# Patient Record
Sex: MALE | Race: WHITE
[De-identification: names, ages, dates, MRNs, and addresses within clinical notes are randomized per-mention and may not be internally consistent; named-entity substitution may affect disease eponyms.]

---

## 2017-02-14 ENCOUNTER — HOSPITAL ENCOUNTER (EMERGENCY)
Dept: HOSPITAL 17 - PHED | Age: 42
Discharge: HOME | End: 2017-02-14
Payer: SELF-PAY

## 2017-02-14 VITALS — WEIGHT: 227.08 LBS | BODY MASS INDEX: 31.79 KG/M2 | HEIGHT: 71 IN

## 2017-02-14 VITALS
DIASTOLIC BLOOD PRESSURE: 90 MMHG | SYSTOLIC BLOOD PRESSURE: 142 MMHG | RESPIRATION RATE: 18 BRPM | OXYGEN SATURATION: 98 % | HEART RATE: 90 BPM | TEMPERATURE: 98.8 F

## 2017-02-14 DIAGNOSIS — R07.89: ICD-10-CM

## 2017-02-14 DIAGNOSIS — F17.210: ICD-10-CM

## 2017-02-14 DIAGNOSIS — R10.9: Primary | ICD-10-CM

## 2017-02-14 LAB
ANION GAP SERPL CALC-SCNC: 8 MEQ/L (ref 5–15)
BASOPHILS # BLD AUTO: 0.3 TH/MM3 (ref 0–0.2)
BASOPHILS NFR BLD: 2.6 % (ref 0–2)
BUN SERPL-MCNC: 16 MG/DL (ref 7–18)
CHLORIDE SERPL-SCNC: 108 MEQ/L (ref 98–107)
CK MB SERPL-MCNC: 0.7 NG/ML (ref 0.5–3.6)
CK SERPL-CCNC: 247 U/L (ref 39–308)
COLOR UR: YELLOW
COMMENT (UR): (no result)
CULTURE IF INDICATED: (no result)
EOSINOPHIL # BLD: 0.3 TH/MM3 (ref 0–0.4)
EOSINOPHIL NFR BLD: 2.5 % (ref 0–4)
ERYTHROCYTE [DISTWIDTH] IN BLOOD BY AUTOMATED COUNT: 12.3 % (ref 11.6–17.2)
GFR SERPLBLD BASED ON 1.73 SQ M-ARVRAT: 98 ML/MIN (ref 89–?)
GLUCOSE UR STRIP-MCNC: (no result) MG/DL
HCO3 BLD-SCNC: 25.7 MEQ/L (ref 21–32)
HCT VFR BLD CALC: 49.6 % (ref 39–51)
HEMO FLAGS: (no result)
HGB UR QL STRIP: (no result)
KETONES UR STRIP-MCNC: (no result) MG/DL
LYMPHOCYTES # BLD AUTO: 2.8 TH/MM3 (ref 1–4.8)
LYMPHOCYTES NFR BLD AUTO: 23 % (ref 9–44)
MCH RBC QN AUTO: 31.6 PG (ref 27–34)
MCHC RBC AUTO-ENTMCNC: 33.5 % (ref 32–36)
MCV RBC AUTO: 94.5 FL (ref 80–100)
METHOD OF COLLECTION: (no result)
MONOCYTES NFR BLD: 5.6 % (ref 0–8)
MUCOUS THREADS #/AREA URNS LPF: (no result) /LPF
NEUTROPHILS # BLD AUTO: 8.2 TH/MM3 (ref 1.8–7.7)
NEUTROPHILS NFR BLD AUTO: 66.3 % (ref 16–70)
NITRITE UR QL STRIP: (no result)
PLATELET # BLD: 300 TH/MM3 (ref 150–450)
POTASSIUM SERPL-SCNC: 3.9 MEQ/L (ref 3.5–5.1)
RBC # BLD AUTO: 5.25 MIL/MM3 (ref 4.5–5.9)
RBC #/AREA URNS HPF: (no result) /HPF (ref 0–3)
SODIUM SERPL-SCNC: 142 MEQ/L (ref 136–145)
SP GR UR STRIP: 1.03 (ref 1–1.03)
SQUAMOUS #/AREA URNS HPF: (no result) /HPF (ref 0–5)
WBC # BLD AUTO: 12.3 TH/MM3 (ref 4–11)

## 2017-02-14 PROCEDURE — 93005 ELECTROCARDIOGRAM TRACING: CPT

## 2017-02-14 PROCEDURE — 84484 ASSAY OF TROPONIN QUANT: CPT

## 2017-02-14 PROCEDURE — 81001 URINALYSIS AUTO W/SCOPE: CPT

## 2017-02-14 PROCEDURE — 80048 BASIC METABOLIC PNL TOTAL CA: CPT

## 2017-02-14 PROCEDURE — 71010: CPT

## 2017-02-14 PROCEDURE — 85025 COMPLETE CBC W/AUTO DIFF WBC: CPT

## 2017-02-14 PROCEDURE — 74177 CT ABD & PELVIS W/CONTRAST: CPT

## 2017-02-14 PROCEDURE — 82550 ASSAY OF CK (CPK): CPT

## 2017-02-14 PROCEDURE — 99284 EMERGENCY DEPT VISIT MOD MDM: CPT

## 2017-02-14 PROCEDURE — 82552 ASSAY OF CPK IN BLOOD: CPT

## 2017-02-14 NOTE — RADHPO
EXAM DATE/TIME:  02/14/2017 20:04 

 

[facility] COMPARISON:     

No previous studies available for comparison.

 

                     

INDICATIONS :     

Chest pain since last night.

                     

 

MEDICAL HISTORY :     

None.          

 

SURGICAL HISTORY :     

None.   

 

ENCOUNTER:     

Initial                                        

 

ACUITY:     

2 days      

 

PAIN SCORE:     

5/10

 

LOCATION:     

Bilateral chest 

 

FINDINGS:     

A single view of the chest demonstrates the lungs to be symmetrically aerated without evidence of mas
s, infiltrate or effusion.  The cardiomediastinal contours are unremarkable.  Osseous structures are 
intact.

 

CONCLUSION:     No acute disease.  

 

 

 

 Salvador Blair MD on February 14, 2017 at 20:28           

Board Certified Radiologist.

 This report was verified electronically.

## 2017-02-14 NOTE — RADHPO
EXAM DATE/TIME:  02/14/2017 20:57 

 

[facility] COMPARISON:     No previous studies available for comparison.

 

INDICATIONS :     Right upper quadrant pain.

                      

IV CONTRAST:     75 cc Omnipaque 350 (iohexol) IV 

 

ORAL CONTRAST:      No oral contrast ingested.

                      

RADIATION DOSE:     21.07 CTDIvol (mGy) 

 

MEDICAL HISTORY :     None  

SURGICAL HISTORY :      None. 

ENCOUNTER:      Initial

ACUITY:      1 day

PAIN SCALE:      7/10

LOCATION:       Right upper quadrant 

 

TECHNIQUE:     Volumetric scanning of the abdomen and pelvis was performed.  Using automated exposure
 control and adjustment of the mA and/or kV according to patient size, radiation dose was kept as low
 as reasonably achievable to obtain optimal diagnostic quality images. 

 

FINDINGS:     There is a 1.5 cm hyperdense area seen in the medial segment of the left lobe of the li
maurice.  This is nonspecific.  It likely represents a cyst or hemangioma.  The liver is otherwise clear.
  The gallbladder is decompressed. The spleen, pancreas, adrenal glands and kidneys appear normal.  T
he bowel appears normal.  The appendix is normal.  The pelvic structures appear grossly intact.  The 
lung bases are clear.  The bony structures are grossly intact.  

 

CONCLUSION:     

1. No acute abnormality is seen.  

2. 1.5 cm hyperdensity seen in the left lobe of the liver likely representing a cyst or hemangioma.  


 

 

 Salvador Blair MD on February 14, 2017 at 21:19                

Board Certified Radiologist.

 This report was verified electronically.

## 2017-02-14 NOTE — PD
HPI


Chief Complaint:  Chest Pain


Time Seen by Provider:  20:31


Travel History


International Travel<30 days:  No


Contact w/Intl Traveler<30days:  No


Traveled to known affect area:  No





History of Present Illness


HPI


The patient is a 41-year-old male that complains of right upper quadrant pain 

for one week and lower chest pain about 24 hours.  He denies any nausea or 

vomiting.  The patient has been here before for noncardiac chest pain.  He 

denies any history of heart disease.  He does not have a doctor and he does not 

have insurance.  He states his last drink of alcohol was Sunday.  He does smoke 

one pack a day.





[facility]


Past Medical History


Diminished Hearing:  No


Gastrointestinal Disorders:  Yes


Ulcer:  Yes (STOMACH/COLON)





Social History


Alcohol Use:  Yes (WEEKENDS)


Tobacco Use:  Yes (1 PPD)


Substance Use:  No





Allergies-Medications


(Allergen,Severity, Reaction):  


Coded Allergies:  


     No Known Allergies (Verified , 2/14/17)


Reported Meds & Prescriptions





Reported Meds & Active Scripts


Active








Review of Systems


Except as stated in HPI:  all other systems reviewed are Neg





Physical Exam


Narrative


GENERAL: The patient is alert, oriented 3 and slight apparent distress with 

his right flank pain.  His vital signs show blood pressure 142/90 but otherwise 

normal.


SKIN: Warm and dry.  No skin rash is present.


HEAD: Atraumatic. Normocephalic. 


EYES: Pupils equal and round. No scleral icterus. No injection or drainage. 


ENT: No nasal bleeding or discharge.  Mucous membranes pink and moist.


NECK: Trachea midline. No JVD. 


CARDIOVASCULAR: Regular rate and rhythm.  No murmur appreciated.


RESPIRATORY: No accessory muscle use. Clear to auscultation. Breath sounds 

equal bilaterally. 


GASTROINTESTINAL: Abdomen soft, with tenderness to direct palpation in the 

right upper quadrant, nondistended.  No guarding or rebound is present.  

Hepatic margin appears about 3 cm below the right costal margin.


MUSCULOSKELETAL: No obvious deformities. No clubbing.  No cyanosis.  No edema. 


NEUROLOGICAL: Awake and alert. No obvious cranial nerve deficits.  Motor 

grossly within normal limits. Normal speech.


PSYCHIATRIC: Appropriate mood and affect; insight and judgment normal.





Data


Data


Last Documented VS





Vital Signs








  Date Time  Temp Pulse Resp B/P Pulse Ox O2 Delivery O2 Flow Rate FiO2


 


2/14/17 18:21 98.8 90 18 142/90 98   








Orders





 Urinalysis - C+S If Indicated (2/14/17 19:15)


Electrocardiogram (2/14/17 19:22)


Complete Blood Count With Diff (2/14/17 19:22)


Basic Metabolic Panel (Bmp) (2/14/17 19:22)


Ckmb (Isoenzyme) Profile (2/14/17 19:22)


Troponin I (2/14/17 19:22)


Chest, Single Ap (2/14/17 19:22)


Iv Access Insert/Monitor (2/14/17 19:22)


Ecg Monitoring (2/14/17 19:22)


Oxygen Administration (2/14/17 19:22)


Oximetry (2/14/17 19:22)


CKMB (2/14/17 19:32)


CKMB% (2/14/17 19:32)


Ct Abd/Pel W Iv Contrast(Rout) (2/14/17 20:44)


Iohexol 350 Inj (Omnipaque 350 Inj) (2/14/17 21:13)





Labs





 Laboratory Tests








Test 2/14/17 2/14/17





 19:32 19:45


 


White Blood Count 12.3 TH/MM3 


 


Red Blood Count 5.25 MIL/MM3 


 


Hemoglobin 16.6 GM/DL 


 


Hematocrit 49.6 % 


 


Mean Corpuscular Volume 94.5 FL 


 


Mean Corpuscular Hemoglobin 31.6 PG 


 


Mean Corpuscular Hemoglobin 33.5 % 





Concent  


 


Red Cell Distribution Width 12.3 % 


 


Platelet Count 300 TH/MM3 


 


Mean Platelet Volume 7.4 FL 


 


Neutrophils (%) (Auto) 66.3 % 


 


Lymphocytes (%) (Auto) 23.0 % 


 


Monocytes (%) (Auto) 5.6 % 


 


Eosinophils (%) (Auto) 2.5 % 


 


Basophils (%) (Auto) 2.6 % 


 


Neutrophils # (Auto) 8.2 TH/MM3 


 


Lymphocytes # (Auto) 2.8 TH/MM3 


 


Monocytes # (Auto) 0.7 TH/MM3 


 


Eosinophils # (Auto) 0.3 TH/MM3 


 


Basophils # (Auto) 0.3 TH/MM3 


 


CBC Comment DIFF FINAL  


 


Differential Comment   


 


Sodium Level 142 MEQ/L 


 


Potassium Level 3.9 MEQ/L 


 


Chloride Level 108 MEQ/L 


 


Carbon Dioxide Level 25.7 MEQ/L 


 


Anion Gap 8 MEQ/L 


 


Blood Urea Nitrogen 16 MG/DL 


 


Creatinine 0.86 MG/DL 


 


Estimat Glomerular Filtration 98 ML/MIN 





Rate  


 


Random Glucose 104 MG/DL 


 


Calcium Level 8.3 MG/DL 


 


Total Creatine Kinase 247 U/L 


 


Creatine Kinase MB 0.7 NG/ML 


 


Troponin I LESS THAN 0.02 





 NG/ML 


 


Urine Collection Type  VOIDED 


 


Urine Color  YELLOW 


 


Urine Turbidity  CLEAR 


 


Urine pH  6.5 


 


Urine Specific Gravity  1.027 


 


Urine Protein  NEG mg/dL


 


Urine Glucose (UA)  NEG mg/dL


 


Urine Ketones  TRACE mg/dL


 


Urine Occult Blood  TRACE 


 


Urine Nitrite  NEG 


 


Urine Bilirubin  NEG 


 


Urine Leukocyte Esterase  NEG 


 


Urine RBC  4-9 /hpf


 


Urine Squamous Epithelial  0-2 /hpf





Cells  


 


Urine Amorphous Sediment  FEW 


 


Urine Mucus  FEW /lpf


 


Microscopic Urinalysis Comment  CULT NOT





  INDICATED











MDM


Medical Decision Making


Medical Screen Exam Complete:  Yes


Emergency Medical Condition:  Yes


Medical Record Reviewed:  Yes


Interpretation(s)


The CT abdomen/pelvis with IV contrast shows no acute abnormality.  There is a 

1.5 cm hyperdensity seen in the left lobe of liver which is likely a cyst or 

hemangioma.  The chest x-ray shows no acute disease.  The urine shows trace 

ketones, trace blood with 4-9 red cells but is otherwise normal and culture is 

not indicated.  The basic metabolic profile shows a calcium of 8.3 but is 

otherwise normal.  The cardiac enzymes are normal.  The CBC is normal except 

for minimal elevation of the white count at 12,300.  The EKG is normal with 

normal sinus rhythm rate of 80.


Differential Diagnosis


Ureteral stone, cholecystitis, cholelithiasis with colic, colitis, urinary 

tract infection, renal tumor, hepatic tumor/mass, acute coronary syndrome

highly unlikely, pneumothorax, chest wall pain, chest pain etiology undetermined

, abdominal pain etiology undetermined


Narrative Course


The patient has abdominal/chest pain etiology undetermined.  We could find no 

evidence of acute coronary syndrome or heart related problems.  He did not have 

a urinary stone and there is no evidence for choledocholithiasis.  No renal/

hepatic tumors were noted.





Impression: Abdominal pain unknown etiology





Plan: The patient to follow up with primary care physician.  He is given Motrin 

800 mg 3 times daily.





Diagnosis





 Primary Impression:  


 Abdominal pain of unknown etiology


 Additional Impression:  


 Chest wall pain





***Additional Instructions:


As we discussed, follow-up with a primary care physician about this pain.  We 

should try an anti-inflammatory such as Motrin 800 mg taken regularly 3 times 

daily.


***Med/Other Pt SpecificInfo:  Prescription(s) given


Scripts


Ibuprofen 800 Mg [id number] Mg PO TID  #40 TAB  Ref 0


   Prov:Kaushik Noguera MD         2/14/17


Disposition:  01 DISCHARGE HOME


Condition:  Stable








Kaushik Noguera MD Feb 14, 2017 20:44

## 2017-02-15 NOTE — EKG
Date Performed: 02/14/2017       Time Performed: 19:25:32

 

PTAGE:      41 years

 

EKG:      Sinus rhythm 

 

 Normal ECG

 

PREVIOUS TRACING       : 06/11/2015 15.38

 

DOCTOR:   Odessa Cerna  Interpretating Date/Time  02/15/2017 22:55:08

## 2018-02-01 ENCOUNTER — HOSPITAL ENCOUNTER (EMERGENCY)
Dept: HOSPITAL 17 - PHED | Age: 43
Discharge: HOME | End: 2018-02-01
Payer: SELF-PAY

## 2018-02-01 VITALS
OXYGEN SATURATION: 98 % | DIASTOLIC BLOOD PRESSURE: 79 MMHG | HEART RATE: 85 BPM | SYSTOLIC BLOOD PRESSURE: 126 MMHG | RESPIRATION RATE: 18 BRPM | TEMPERATURE: 97.4 F

## 2018-02-01 VITALS — WEIGHT: 228.84 LBS | BODY MASS INDEX: 32.04 KG/M2 | HEIGHT: 71 IN

## 2018-02-01 DIAGNOSIS — L02.416: Primary | ICD-10-CM

## 2018-02-01 DIAGNOSIS — B96.89: ICD-10-CM

## 2018-02-01 PROCEDURE — 10060 I&D ABSCESS SIMPLE/SINGLE: CPT

## 2018-02-01 PROCEDURE — 87205 SMEAR GRAM STAIN: CPT

## 2018-02-01 PROCEDURE — 87185 SC STD ENZYME DETCJ PER NZM: CPT

## 2018-02-01 PROCEDURE — 87070 CULTURE OTHR SPECIMN AEROBIC: CPT

## 2018-02-01 NOTE — PD
HPI


Chief Complaint:  Skin Problem


Time Seen by Provider:  03:47


Travel History


International Travel<30 days:  No


Contact w/Intl Traveler<30days:  No


Traveled to known affect area:  No





History of Present Illness


HPI


The patient is a 42-year-old male that noticed a lump becoming progressively 

larger and more painful in the last 4 days located in his left medial thigh, 

just below the inguinal ligament.  The patient denies any fever.  He saw 

another physician about this and this physician prescribed Cipro.  He did not 

drain the area.  His last tetanus shot was 10 years ago but he does not want a 

shot in tonight





[facility]


Past Medical History


Diminished Hearing:  No


Gastrointestinal Disorders:  Yes


Ulcer:  Yes (STOMACH/COLON)





Social History


Alcohol Use:  Yes (WEEKENDS)


Tobacco Use:  Yes (1 PPD)


Substance Use:  No





Allergies-Medications


(Allergen,Severity, Reaction):  


Coded Allergies:  


     No Known Allergies (Verified , 2/14/17)


Reported Meds & Prescriptions





Reported Meds & Active Scripts


Active


Ibuprofen 800 Mg Tab 800 Mg PO TID








Review of Systems


Except as stated in HPI:  all other systems reviewed are Neg





Physical Exam


Narrative


GENERAL: The patient is alert, oriented 3 in moderate distress with his left 

thigh abscess.  His vital signs are normal.


SKIN: Focused skin assessment warm/dry.


HEAD: Atraumatic. Normocephalic. 


EYES: Pupils equal and round. No scleral icterus. No injection or drainage. 


ENT: No nasal bleeding or discharge.  Mucous membranes pink and moist.


NECK: Trachea midline. No JVD. 


CARDIOVASCULAR: Regular rate and rhythm.  No murmur appreciated.


RESPIRATORY: No accessory muscle use. Clear to auscultation. Breath sounds 

equal bilaterally. 


GASTROINTESTINAL: Abdomen soft, non-tender, nondistended. Hepatic and splenic 

margins not palpable. 


MUSCULOSKELETAL: No obvious deformities. No clubbing.  No cyanosis.  No edema. 


NEUROLOGICAL: Awake and alert. No obvious cranial nerve deficits.  Motor 

grossly within normal limits. Normal speech.


PSYCHIATRIC: Appropriate mood and affect; insight and judgment normal.





Data


Data


Last Documented VS





Vital Signs








  Date Time  Temp Pulse Resp B/P (MAP) Pulse Ox O2 Delivery O2 Flow Rate FiO2


 


2/1/18 03:28 97.4 85 18 126/79 (95) 98   








Orders





 Orders


Lidocai-Epi 1%-1:100,000 Inj (Xylocaine- (2/1/18 04:00)








[facility]


Medical Decision Making


Medical Screen Exam Complete:  Yes


Emergency Medical Condition:  Yes


Medical Record Reviewed:  Yes


Differential Diagnosis


Abscess, cellulitis, lymph node


Narrative Course


The patient had an abscess in his left inner thigh.  Incision and drainage 

produced a surprisingly large amount of pus.  The patient felt much better 

after this was drained.





Procedures


**Procedure Narrative**


Following a Betadine prep and, under sterile technique, a field block was done 

on the abscess.  The abscess was opened with a 1 cm incision by a #11 blade.  A 

large amount of pus was recovered.  The cavity was cleaned and loculations 

broken up using peroxide moistened Q-tips.  The patient tolerated the procedure 

well.





Diagnosis





 Primary Impression:  


 Abscess of left thigh


 Additional Impression:  


 Encounter for incision and drainage procedure





***Additional Instructions:  


We gave you a bottle of peroxide and some Q-tips.  For about 3 or 4 days you 

should insert a peroxide moistened Q-tip into the cavity twice daily.  After 

these 3 or 4 days due to it only once daily.  After about a week to 10 days the 

abscess should be clean and the incision open and should heal up fairly well.  

If you have any problems, return to the emergency department and we will glad 

to see you for these.  Continue to take the Cipro as prescribed by the other 

physician.


***Med/Other Pt SpecificInfo:  No Change to Meds


Disposition:  01 DISCHARGE HOME


Condition:  Stable











Kaushik Noguera MD Feb 1, 2018 04:15